# Patient Record
Sex: FEMALE | Race: WHITE | NOT HISPANIC OR LATINO | URBAN - METROPOLITAN AREA
[De-identification: names, ages, dates, MRNs, and addresses within clinical notes are randomized per-mention and may not be internally consistent; named-entity substitution may affect disease eponyms.]

---

## 2022-04-01 PROBLEM — Z00.00 ENCOUNTER FOR PREVENTIVE HEALTH EXAMINATION: Status: ACTIVE | Noted: 2022-04-01

## 2022-04-07 ENCOUNTER — OUTPATIENT (OUTPATIENT)
Dept: OUTPATIENT SERVICES | Facility: HOSPITAL | Age: 27
LOS: 1 days | End: 2022-04-07
Payer: COMMERCIAL

## 2022-04-07 ENCOUNTER — APPOINTMENT (OUTPATIENT)
Dept: ORTHOPEDIC SURGERY | Facility: CLINIC | Age: 27
End: 2022-04-07
Payer: SELF-PAY

## 2022-04-07 ENCOUNTER — RESULT REVIEW (OUTPATIENT)
Age: 27
End: 2022-04-07

## 2022-04-07 VITALS
TEMPERATURE: 96.8 F | BODY MASS INDEX: 24.84 KG/M2 | HEART RATE: 76 BPM | OXYGEN SATURATION: 96 % | HEIGHT: 62 IN | WEIGHT: 135 LBS | SYSTOLIC BLOOD PRESSURE: 105 MMHG | DIASTOLIC BLOOD PRESSURE: 72 MMHG

## 2022-04-07 DIAGNOSIS — Z78.9 OTHER SPECIFIED HEALTH STATUS: ICD-10-CM

## 2022-04-07 DIAGNOSIS — Z60.2 PROBLEMS RELATED TO LIVING ALONE: ICD-10-CM

## 2022-04-07 DIAGNOSIS — M67.431 GANGLION, RIGHT WRIST: ICD-10-CM

## 2022-04-07 PROCEDURE — 73110 X-RAY EXAM OF WRIST: CPT | Mod: 26,RT

## 2022-04-07 PROCEDURE — 73110 X-RAY EXAM OF WRIST: CPT

## 2022-04-07 PROCEDURE — 99204 OFFICE O/P NEW MOD 45 MIN: CPT

## 2022-04-07 SDOH — SOCIAL STABILITY - SOCIAL INSECURITY: PROBLEMS RELATED TO LIVING ALONE: Z60.2

## 2022-04-08 NOTE — HISTORY OF PRESENT ILLNESS
[de-identified] : 26F, hx of dorsal R ganglion cyst (s/p u/s guided aspiration in Sept 2021), who presents to our clinic due to recurrence of dorsal sided wrist pain since Jan 2022. She states that after the aspiration, she experienced relief of sxs (reduction in size, relief of dorsal wrist pain during actvitities that require wrist flexion/extension); however the wrist pain occurred in late Jan alongsiode a palpable nodule. States that nodule is reduced in size this afternoon but that it is still palpable. Denies numbness/tingling in all five fingers, Denies

## 2022-04-08 NOTE — PHYSICAL EXAM
[de-identified] : General: No acute distress, conversant, well-nourished.\par Head: Normocephalic, atraumatic\par Neck: trachea midline, FROM\par Heart: normotensive and normal rate and rhythm\par Lungs: No labored breathing\par Skin: No abrasions, no rashes, no edema\par Psych: Alert and oriented to person, place and time\par Extremities: no peripheral edema or digital cyanosis\par Gait: Normal gait. Can perform tandem gait.  \par Vascular: warm and well perfused distally, palpable distal pulses\par \par RUE: \par Right wrist\par palpable dorsal ganglion wrist, confirmed on US\par SILT m/r/u\par +motor EPL/FPL/EDC/FDP/IO\par WWP distally

## 2022-04-08 NOTE — ASSESSMENT
[FreeTextEntry1] : 26 year old female presents with right wrist pain secondary to a dorsal ganglion cyst. She attempted aspiration but the cyst came back.  She would like surgical intervention. We discussed excision of a right wrist dorsal ganglion cyst.  We discussed the risks and benefits of surgery. The risks include anesthesia, blood loss, need for blood transfusion, clots, stroke, myocardial infarct, chronic pain, loss of function, need for reoperation, cyst reoccurrence, infection, wound complications, Covid-19, and damage to neurovascular structures.  The patient understands the risks. She asked several great questions all of which were answered. She elects to proceed with surgery.  She will be scheduled for surgery. We discussed red flag symptoms that would require emergent evaluation. She knows to call with any questions or concerns or if her symptoms acutely worsen.

## 2022-04-11 LAB
ANION GAP SERPL CALC-SCNC: 16 MMOL/L
BUN SERPL-MCNC: 11 MG/DL
CALCIUM SERPL-MCNC: 9.4 MG/DL
CHLORIDE SERPL-SCNC: 103 MMOL/L
CO2 SERPL-SCNC: 21 MMOL/L
CREAT SERPL-MCNC: 0.88 MG/DL
EGFR: 92 ML/MIN/1.73M2
GLUCOSE SERPL-MCNC: 95 MG/DL
POTASSIUM SERPL-SCNC: 4.7 MMOL/L
SODIUM SERPL-SCNC: 140 MMOL/L

## 2022-04-12 LAB
APTT BLD: 31.9 SEC
BASOPHILS # BLD AUTO: 0.04 K/UL
BASOPHILS NFR BLD AUTO: 0.5 %
EOSINOPHIL # BLD AUTO: 0.12 K/UL
EOSINOPHIL NFR BLD AUTO: 1.5 %
HCT VFR BLD CALC: 38.6 %
HGB BLD-MCNC: 12.8 G/DL
IMM GRANULOCYTES NFR BLD AUTO: 0.7 %
INR PPP: 1.03 RATIO
LYMPHOCYTES # BLD AUTO: 1.81 K/UL
LYMPHOCYTES NFR BLD AUTO: 22.5 %
MAN DIFF?: NORMAL
MCHC RBC-ENTMCNC: 30.3 PG
MCHC RBC-ENTMCNC: 33.2 GM/DL
MCV RBC AUTO: 91.5 FL
MONOCYTES # BLD AUTO: 0.75 K/UL
MONOCYTES NFR BLD AUTO: 9.3 %
NEUTROPHILS # BLD AUTO: 5.28 K/UL
NEUTROPHILS NFR BLD AUTO: 65.5 %
PLATELET # BLD AUTO: 340 K/UL
PT BLD: 12 SEC
RBC # BLD: 4.22 M/UL
RBC # FLD: 12 %
WBC # FLD AUTO: 8.06 K/UL

## 2022-04-15 ENCOUNTER — APPOINTMENT (OUTPATIENT)
Dept: ORTHOPEDIC SURGERY | Facility: AMBULATORY SURGERY CENTER | Age: 27
End: 2022-04-15